# Patient Record
(demographics unavailable — no encounter records)

---

## 2025-04-23 NOTE — HISTORY OF PRESENT ILLNESS
[de-identified] :  SHAHRIAR LANG has a history of recurrent cerumen impactions   [FreeTextEntry1] : 4/23/2025 Ear fullness reported. No ear pain.

## 2025-04-23 NOTE — ASSESSMENT
[FreeTextEntry1] : Ear hygiene reviewed.  Borderline high-frequency hearing loss detected last year.  Repeat audiometry suggested.